# Patient Record
Sex: MALE | Race: WHITE | Employment: FULL TIME | URBAN - METROPOLITAN AREA
[De-identification: names, ages, dates, MRNs, and addresses within clinical notes are randomized per-mention and may not be internally consistent; named-entity substitution may affect disease eponyms.]

---

## 2020-07-21 RX ORDER — DEXAMETHASONE 4 MG/1
4 TABLET ORAL 2 TIMES DAILY WITH MEALS
COMMUNITY

## 2020-07-21 RX ORDER — UBIDECARENONE 75 MG
50 CAPSULE ORAL DAILY
COMMUNITY

## 2020-07-21 NOTE — PROGRESS NOTES
C-Difficile admission screening and protocol:     * Admitted with diarrhea? no     *Prior history of C-Diff. In last 3 months? no     *Antibiotic use in the past 6-8 weeks? No     *Prior hospitalization or nursing home in the last month?    no

## 2020-07-21 NOTE — PROGRESS NOTES
fitting shirt. Bring eye drops or ointment day of surgery. Do not wear any make-up or nail polish on your fingers or toes      For your safety, please do not wear any jewelry or body piercing's on the day of surgery. All jewelry must be removed. If you have dentures, they will be removed before going to operating room. For your convenience, we will provide you with a container. If you wear contact lenses or glasses, they will be removed, please bring a case for them. If you have a living will and a durable power of  for healthcare, please bring in a copy. As part of our patient safety program to minimize surgical site infections, we ask you to do the following:    · Please notify your surgeon if you develop any illness between         now and the  day of your surgery. · This includes a cough, cold, fever, sore throat, nausea,         or vomiting, and diarrhea, etc.  ·  Please notify your surgeon if you experience dizziness, shortness         of breath or blurred vision between now and the time of your surgery. Do not shave your operative site 96 hours prior to surgery. For face and neck surgery, men may use an electric razor 48 hours   prior to surgery. You may shower the night before surgery or the morning of   your surgery with an antibacterial soap. You will need to bring a photo ID and insurance card    [facility] has an onsite pharmacy, would you like to utilize our pharmacy     If you will be staying overnight and use a C-pap machine, please bring   your C-pap to hospital     Our goal is to provide you with excellent care, therefore, visitors will be limited to two(2) in the room at a time so that we may focus on providing this care for you. Please contact pre-admission testing if you have any further questions.                  [facility] phone number:  [phone]    Please note these are generalized instructions for all surgical cases, you may be provided with more specific instructions according to your surgery.

## 2020-07-22 RX ORDER — AMLODIPINE BESYLATE 5 MG/1
5 TABLET ORAL DAILY
COMMUNITY

## 2020-07-24 ENCOUNTER — PREP FOR PROCEDURE (OUTPATIENT)
Dept: OPHTHALMOLOGY | Age: 60
End: 2020-07-24

## 2020-07-24 RX ORDER — SODIUM CHLORIDE 0.9 % (FLUSH) 0.9 %
10 SYRINGE (ML) INJECTION PRN
Status: CANCELLED | OUTPATIENT
Start: 2020-07-24

## 2020-07-24 RX ORDER — TETRACAINE HYDROCHLORIDE 5 MG/ML
1 SOLUTION OPHTHALMIC ONCE
Status: CANCELLED | OUTPATIENT
Start: 2020-07-24 | End: 2020-07-24

## 2020-07-24 RX ORDER — CIPROFLOXACIN HYDROCHLORIDE 3.5 MG/ML
1 SOLUTION/ DROPS TOPICAL SEE ADMIN INSTRUCTIONS
Status: CANCELLED | OUTPATIENT
Start: 2020-07-24

## 2020-07-24 RX ORDER — SODIUM CHLORIDE 0.9 % (FLUSH) 0.9 %
10 SYRINGE (ML) INJECTION EVERY 12 HOURS SCHEDULED
Status: CANCELLED | OUTPATIENT
Start: 2020-07-24

## 2020-07-28 ENCOUNTER — ANESTHESIA EVENT (OUTPATIENT)
Dept: SURGERY | Age: 60
End: 2020-07-28
Payer: COMMERCIAL

## 2020-07-29 NOTE — PROGRESS NOTES
[address]        Pre-Op Phone Call:     Patient Name: Rogelio Saha     No relevant phone numbers on file. Home phone:  [phone]    Surgery Time:   10:10 AM     Arrival Time:  0840     Left extended Message:  NA     Message left with:     Recent change in health status:  No     Advised of transportation/ policy:  Yes     NPO policy reviewed:  Yes wife Esteban Maldonado to take morning heart/blood pressure medications with sips of water morning of surgery? Yes     Instructed to bring eye drops, photo identification, and insurance card day of surgery? Yes     Advised to wear short sleeved button down shirt (no T-shirt underneath):  Yes     Advised not to wear jewelry, hairpins, or pantyhose day of surgery? Yes     Advised not to wear make-up and to wash face day of surgery?   Yes    Remarks:        Electronically signed by:  Mishel Patel RN at 7/29/2020 2:11 PM

## 2020-07-30 ENCOUNTER — HOSPITAL ENCOUNTER (OUTPATIENT)
Age: 60
Setting detail: OUTPATIENT SURGERY
Discharge: HOME | End: 2020-07-30
Attending: OPHTHALMOLOGY | Admitting: OPHTHALMOLOGY
Payer: COMMERCIAL

## 2020-07-30 ENCOUNTER — ANESTHESIA (OUTPATIENT)
Dept: SURGERY | Age: 60
End: 2020-07-30
Payer: COMMERCIAL

## 2020-07-30 VITALS
BODY MASS INDEX: 29.55 KG/M2 | TEMPERATURE: 97.9 F | SYSTOLIC BLOOD PRESSURE: 151 MMHG | OXYGEN SATURATION: 93 % | WEIGHT: 195 LBS | DIASTOLIC BLOOD PRESSURE: 82 MMHG | HEART RATE: 83 BPM | RESPIRATION RATE: 14 BRPM | HEIGHT: 68 IN

## 2020-07-30 VITALS — OXYGEN SATURATION: 100 % | SYSTOLIC BLOOD PRESSURE: 144 MMHG | DIASTOLIC BLOOD PRESSURE: 85 MMHG

## 2020-07-30 PROCEDURE — 6360000002 HC RX W HCPCS: Performed by: NURSE ANESTHETIST, CERTIFIED REGISTERED

## 2020-07-30 PROCEDURE — 3700000000 HC ANESTHESIA ATTENDED CARE: Performed by: OPHTHALMOLOGY

## 2020-07-30 PROCEDURE — 7100000010 HC PHASE II RECOVERY - FIRST 15 MIN: Performed by: OPHTHALMOLOGY

## 2020-07-30 PROCEDURE — 2580000003 HC RX 258: Performed by: ANESTHESIOLOGY

## 2020-07-30 PROCEDURE — V2632 POST CHMBR INTRAOCULAR LENS: HCPCS | Performed by: OPHTHALMOLOGY

## 2020-07-30 PROCEDURE — 6370000000 HC RX 637 (ALT 250 FOR IP): Performed by: OPHTHALMOLOGY

## 2020-07-30 PROCEDURE — 3700000001 HC ADD 15 MINUTES (ANESTHESIA): Performed by: OPHTHALMOLOGY

## 2020-07-30 PROCEDURE — 2500000003 HC RX 250 WO HCPCS: Performed by: OPHTHALMOLOGY

## 2020-07-30 PROCEDURE — 2709999900 HC NON-CHARGEABLE SUPPLY: Performed by: OPHTHALMOLOGY

## 2020-07-30 PROCEDURE — 3600000014 HC SURGERY LEVEL 4 ADDTL 15MIN: Performed by: OPHTHALMOLOGY

## 2020-07-30 PROCEDURE — 3600000004 HC SURGERY LEVEL 4 BASE: Performed by: OPHTHALMOLOGY

## 2020-07-30 DEVICE — LENS IOL SN60WF 19.0D: Type: IMPLANTABLE DEVICE | Site: EYE | Status: FUNCTIONAL

## 2020-07-30 RX ORDER — TETRACAINE HYDROCHLORIDE 5 MG/ML
SOLUTION OPHTHALMIC
Status: COMPLETED | OUTPATIENT
Start: 2020-07-30 | End: 2020-07-30

## 2020-07-30 RX ORDER — OFLOXACIN 3 MG/ML
SOLUTION/ DROPS OPHTHALMIC
Status: COMPLETED | OUTPATIENT
Start: 2020-07-30 | End: 2020-07-30

## 2020-07-30 RX ORDER — SODIUM CHLORIDE 0.9 % (FLUSH) 0.9 %
10 SYRINGE (ML) INJECTION PRN
Status: DISCONTINUED | OUTPATIENT
Start: 2020-07-30 | End: 2020-07-30 | Stop reason: HOSPADM

## 2020-07-30 RX ORDER — FENTANYL CITRATE 50 UG/ML
INJECTION, SOLUTION INTRAMUSCULAR; INTRAVENOUS PRN
Status: DISCONTINUED | OUTPATIENT
Start: 2020-07-30 | End: 2020-07-30 | Stop reason: SDUPTHER

## 2020-07-30 RX ORDER — SODIUM CHLORIDE 0.9 % (FLUSH) 0.9 %
10 SYRINGE (ML) INJECTION EVERY 12 HOURS SCHEDULED
Status: DISCONTINUED | OUTPATIENT
Start: 2020-07-30 | End: 2020-07-30 | Stop reason: SDUPTHER

## 2020-07-30 RX ORDER — BRIMONIDINE TARTRATE 2 MG/ML
SOLUTION/ DROPS OPHTHALMIC
Status: COMPLETED | OUTPATIENT
Start: 2020-07-30 | End: 2020-07-30

## 2020-07-30 RX ORDER — TETRACAINE HYDROCHLORIDE 5 MG/ML
1 SOLUTION OPHTHALMIC ONCE
Status: COMPLETED | OUTPATIENT
Start: 2020-07-30 | End: 2020-07-30

## 2020-07-30 RX ORDER — SODIUM CHLORIDE 9 MG/ML
INJECTION, SOLUTION INTRAVENOUS CONTINUOUS
Status: DISCONTINUED | OUTPATIENT
Start: 2020-07-30 | End: 2020-07-30 | Stop reason: HOSPADM

## 2020-07-30 RX ORDER — BALANCED SALT SOLUTION 6.4; .75; .48; .3; 3.9; 1.7 MG/ML; MG/ML; MG/ML; MG/ML; MG/ML; MG/ML
SOLUTION OPHTHALMIC
Status: COMPLETED | OUTPATIENT
Start: 2020-07-30 | End: 2020-07-30

## 2020-07-30 RX ORDER — SODIUM CHLORIDE 0.9 % (FLUSH) 0.9 %
10 SYRINGE (ML) INJECTION EVERY 12 HOURS SCHEDULED
Status: DISCONTINUED | OUTPATIENT
Start: 2020-07-30 | End: 2020-07-30 | Stop reason: HOSPADM

## 2020-07-30 RX ORDER — SODIUM CHLORIDE 0.9 % (FLUSH) 0.9 %
10 SYRINGE (ML) INJECTION PRN
Status: DISCONTINUED | OUTPATIENT
Start: 2020-07-30 | End: 2020-07-30 | Stop reason: SDUPTHER

## 2020-07-30 RX ORDER — CIPROFLOXACIN HYDROCHLORIDE 3.5 MG/ML
1 SOLUTION/ DROPS TOPICAL SEE ADMIN INSTRUCTIONS
Status: COMPLETED | OUTPATIENT
Start: 2020-07-30 | End: 2020-07-30

## 2020-07-30 RX ADMIN — Medication 0.5 ML: at 08:54

## 2020-07-30 RX ADMIN — FENTANYL CITRATE 50 MCG: 50 INJECTION INTRAMUSCULAR; INTRAVENOUS at 09:56

## 2020-07-30 RX ADMIN — Medication 0.5 ML: at 09:05

## 2020-07-30 RX ADMIN — SODIUM CHLORIDE: 9 INJECTION, SOLUTION INTRAVENOUS at 09:04

## 2020-07-30 RX ADMIN — CIPROFLOXACIN 1 DROP: 3 SOLUTION OPHTHALMIC at 09:08

## 2020-07-30 RX ADMIN — POVIDONE-IODINE 0.1 ML: 5 SOLUTION OPHTHALMIC at 09:03

## 2020-07-30 RX ADMIN — TETRACAINE HYDROCHLORIDE 1 DROP: 5 SOLUTION OPHTHALMIC at 09:02

## 2020-07-30 RX ADMIN — CIPROFLOXACIN 1 DROP: 3 SOLUTION OPHTHALMIC at 09:03

## 2020-07-30 RX ADMIN — FENTANYL CITRATE 50 MCG: 50 INJECTION INTRAMUSCULAR; INTRAVENOUS at 09:53

## 2020-07-30 ASSESSMENT — PAIN - FUNCTIONAL ASSESSMENT: PAIN_FUNCTIONAL_ASSESSMENT: 0-10

## 2020-07-30 ASSESSMENT — PAIN SCALES - GENERAL
PAINLEVEL_OUTOF10: 0
PAINLEVEL_OUTOF10: 0

## 2020-07-30 ASSESSMENT — LIFESTYLE VARIABLES: SMOKING_STATUS: 0

## 2020-07-30 NOTE — PROGRESS NOTES
JOSH RN reviewed and gave patient and family/friend discharge instructions, drop schedule and IOL card. No further questions at this time. Patient stated ready for discharge.

## 2020-07-30 NOTE — ANESTHESIA POSTPROCEDURE EVALUATION
Department of Anesthesiology  Postprocedure Note    Patient: Charmayne Leas  MRN: [id number]  YOB: 1960  Date of evaluation: 7/30/2020  Time:  10:54 AM     Procedure Summary     Date:  07/30/20 Room / Location:  [address]    Anesthesia Start:  3730 Anesthesia Stop:  1006    Procedure:  Phacoemulsification with intraocular lens implant (Left Eye) Diagnosis:       Nuclear sclerosis, left      (Nuclear sclerosis, left)    Surgeon:  Angie Jimenes MD Responsible Provider:  Yin Wharton MD    Anesthesia Type:  MAC ASA Status:  3          Anesthesia Type: MAC    Aileen Phase I: Aileen Score: 10    Aileen Phase II: Aileen Score: 9    Last vitals: Reviewed and per EMR flowsheets.        Anesthesia Post Evaluation    Patient location during evaluation: PACU  Patient participation: complete - patient participated  Level of consciousness: awake and alert  Pain score: 0  Airway patency: patent  Nausea & Vomiting: no nausea and no vomiting  Complications: no  Cardiovascular status: blood pressure returned to baseline  Respiratory status: acceptable  Hydration status: euvolemic

## 2020-07-30 NOTE — ANESTHESIA PRE PROCEDURE
[facility] Department of Anesthesiology  Pre-Anesthesia Evaluation/Consultation       Name:  Malissa Vazquez  : 1960  Age:  62 y.o. MRN:  [id number]  Date: 2020           Surgeon: Surgeon(s):  Oliverio Prasad MD    Procedure: Procedure(s):  Phacoemulsification with intraocular lens implant     No Known Allergies  There is no problem list on file for this patient. 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       Current Outpatient Medications on File Prior to Encounter   Medication Sig Dispense Refill    amLODIPine (NORVASC) 5 MG tablet Take 5 mg by mouth daily      dexamethasone (DECADRON) 4 MG tablet Take 4 mg by mouth 2 times daily (with meals)      apixaban (ELIQUIS) 5 MG TABS tablet Take by mouth 2 times daily      eriBULin mesylate (HALAVEN) 1 MG/2ML SOLN injection Infuse 1.4 mg/m2 intravenously once Indications: on a break from it righ now      vitamin B-12 (CYANOCOBALAMIN) 100 MCG tablet Take 50 mcg by mouth daily       Current Facility-Administered Medications   Medication Dose Route Frequency Provider Last Rate Last Dose    0.9 % sodium chloride infusion   Intravenous Continuous Kellie Alex MD 75 mL/hr at 20 0904      sodium chloride flush 0.9 % injection 10 mL  10 mL Intravenous 2 times per day Kellie Alex MD        sodium chloride flush 0.9 % injection 10 mL  10 mL Intravenous PRN Read MD Suraj        ciprofloxacin (CILOXAN) 0.3 % ophthalmic solution 1 drop  1 drop Left Eye See Admin Instructions Delfino Severin, MD   1 drop at 20 0903    cyclopentolate 1%, phenylephrine 2.5%, tropicamide 1%, ketorolac 0.5% ophthalmic solution  0.5 mL Ophthalmic See Admin Instructions Delfino Severin, MD   0.5 mL at 20 0905     Vital Signs (Current)   Vitals:    20 1322 20 0857   BP:  130/75   Pulse:  78   Resp:  28   Temp:  97 °F (36.1 °C)   TempSrc:  Temporal   SpO2:  100%   Weight: 195 lb (88.5 kg)    Height: 5' 8\" (1.727 m)                                           BP Readings from Last 3 Encounters:   20 130/75     Vital Signs Statistics (for past 48 hrs)     Temp  Av °F (36.1 °C)  Min: 97 °F (36.1 °C)   Min taken time: 20 0857  Max: 97 °F (36.1 °C)   Max taken time: 20 0857  Pulse  Av  Min: 66   Min taken time: 20 0857  Max: 66   Max taken time: 20 0857  Resp  Av  Min: 29   Min taken time: 20 0857  Max: 29   Max taken time: 20 0857  BP  Min: 130/75   Min taken time: 20 0857  Max: 130/75   Max taken time: 20 0857  SpO2  Av %  Min: 100 %   Min taken time: 20 0857  Max: 100 %   Max taken time: 20 0857  BP Readings from Last 3 Encounters:   20 130/75       BMI  Body mass index is 29.65 kg/m². Estimated body mass index is 29.65 kg/m² as calculated from the following:    Height as of this encounter: 5' 8\" (1.727 m). Weight as of this encounter: 195 lb (88.5 kg). CBC No results found for: WBC, RBC, HGB, HCT, MCV, RDW, PLT  CMP  No results found for: NA, K, CL, CO2, BUN, CREATININE, GFRAA, AGRATIO, LABGLOM, GLUCOSE, PROT, CALCIUM, BILITOT, ALKPHOS, AST, ALT  BMP  No results found for: NA, K, CL, CO2, BUN, CREATININE, CALCIUM, GFRAA, LABGLOM, GLUCOSE  POCGlucose  No results for input(s): GLUCOSE in the last 72 hours.    Coags  No results found for: PROTIME, INR, 2020  9:06 AM

## 2020-07-30 NOTE — OP NOTE
[facility]    OPERATIVE NOTE    Preoperative Diagnosis: Cataract left eye    Postoperative Diagnosis: Cataract left eye    Procedure: Phacoemulsification with intraocular lens implantation, left eye  Surgeon: Rosalba Landon MD    Anesthesia: MAC, topical.    Complications: none    Estimated blood loss: less than 1 ml. Specimens: none    Indications for procedure: The patient is a 62y.o. year old with decreased vision, glare and halos around lights, and trouble with activities of daily living. Examination revealed a visually significant cataract in the left eye. Risks, benefits, and alternatives to surgery were discussed with the patient and the patient elected to proceed with phacoemulsification with lens implantation. Details of the procedure: Following informed consent, the patient was taken to the operating room and placed in the supine position. Monitored anesthesia care was administered. The eye was prepped and draped in the usual sterile fashion using aseptic technique for cataract surgery. A side port incision was made. 1% preservative free lidocaine was injected through the side port incision for topical anesthesia. The eye was filled with viscoelastic and a 2.4 mm keratome blade was used to make a 3-plane clear corneal incision in the temporal cornea. The cystitome was used to make a tear in the anterior capsule and a Utrata forceps was used to make a complete curvilinear capsulorrhexis. The lens was hydrodissected and freely rotated. Phacoemulsification was performed. Irrigation/aspiration was used to remove all cortical material from the capsular bag. The eye was filled with viscoelastic and a foldable posterior chamber intraocular lens was injected into the capsular bag. The lens was rotated to the appropriate axis as needed. Irrigation/aspiration was used to remove all excess viscoelastic.   The eye was pressurized with BSS and the wounds were check for leaks and none were

## 2020-08-13 ENCOUNTER — ANESTHESIA EVENT (OUTPATIENT)
Dept: SURGERY | Age: 60
End: 2020-08-13
Payer: COMMERCIAL

## 2020-08-13 ENCOUNTER — PREP FOR PROCEDURE (OUTPATIENT)
Dept: OPHTHALMOLOGY | Age: 60
End: 2020-08-13

## 2020-08-13 RX ORDER — SODIUM CHLORIDE 0.9 % (FLUSH) 0.9 %
10 SYRINGE (ML) INJECTION PRN
Status: CANCELLED | OUTPATIENT
Start: 2020-08-13

## 2020-08-13 RX ORDER — TETRACAINE HYDROCHLORIDE 5 MG/ML
1 SOLUTION OPHTHALMIC ONCE
Status: CANCELLED | OUTPATIENT
Start: 2020-08-13 | End: 2020-08-13

## 2020-08-13 RX ORDER — SODIUM CHLORIDE 0.9 % (FLUSH) 0.9 %
10 SYRINGE (ML) INJECTION EVERY 12 HOURS SCHEDULED
Status: CANCELLED | OUTPATIENT
Start: 2020-08-13

## 2020-08-13 RX ORDER — CIPROFLOXACIN HYDROCHLORIDE 3.5 MG/ML
1 SOLUTION/ DROPS TOPICAL SEE ADMIN INSTRUCTIONS
Status: CANCELLED | OUTPATIENT
Start: 2020-08-13

## 2020-08-14 ENCOUNTER — HOSPITAL ENCOUNTER (OUTPATIENT)
Age: 60
Setting detail: OUTPATIENT SURGERY
Discharge: HOME | End: 2020-08-14
Attending: OPHTHALMOLOGY | Admitting: OPHTHALMOLOGY
Payer: COMMERCIAL

## 2020-08-14 ENCOUNTER — ANESTHESIA (OUTPATIENT)
Dept: SURGERY | Age: 60
End: 2020-08-14
Payer: COMMERCIAL

## 2020-08-14 VITALS — DIASTOLIC BLOOD PRESSURE: 72 MMHG | OXYGEN SATURATION: 100 % | SYSTOLIC BLOOD PRESSURE: 115 MMHG

## 2020-08-14 VITALS
DIASTOLIC BLOOD PRESSURE: 64 MMHG | SYSTOLIC BLOOD PRESSURE: 123 MMHG | HEART RATE: 73 BPM | HEIGHT: 68 IN | WEIGHT: 195 LBS | TEMPERATURE: 97.4 F | BODY MASS INDEX: 29.55 KG/M2 | OXYGEN SATURATION: 96 % | RESPIRATION RATE: 21 BRPM

## 2020-08-14 PROCEDURE — 3700000000 HC ANESTHESIA ATTENDED CARE: Performed by: OPHTHALMOLOGY

## 2020-08-14 PROCEDURE — 6360000002 HC RX W HCPCS: Performed by: NURSE ANESTHETIST, CERTIFIED REGISTERED

## 2020-08-14 PROCEDURE — 3600000014 HC SURGERY LEVEL 4 ADDTL 15MIN: Performed by: OPHTHALMOLOGY

## 2020-08-14 PROCEDURE — V2632 POST CHMBR INTRAOCULAR LENS: HCPCS | Performed by: OPHTHALMOLOGY

## 2020-08-14 PROCEDURE — 6370000000 HC RX 637 (ALT 250 FOR IP): Performed by: OPHTHALMOLOGY

## 2020-08-14 PROCEDURE — 3700000001 HC ADD 15 MINUTES (ANESTHESIA): Performed by: OPHTHALMOLOGY

## 2020-08-14 PROCEDURE — 7100000010 HC PHASE II RECOVERY - FIRST 15 MIN: Performed by: OPHTHALMOLOGY

## 2020-08-14 PROCEDURE — 2580000003 HC RX 258: Performed by: ANESTHESIOLOGY

## 2020-08-14 PROCEDURE — 2709999900 HC NON-CHARGEABLE SUPPLY: Performed by: OPHTHALMOLOGY

## 2020-08-14 PROCEDURE — 2500000003 HC RX 250 WO HCPCS: Performed by: OPHTHALMOLOGY

## 2020-08-14 PROCEDURE — 3600000004 HC SURGERY LEVEL 4 BASE: Performed by: OPHTHALMOLOGY

## 2020-08-14 DEVICE — LENS IOL SN60WF 19.0D: Type: IMPLANTABLE DEVICE | Site: EYE | Status: FUNCTIONAL

## 2020-08-14 RX ORDER — CIPROFLOXACIN HYDROCHLORIDE 3.5 MG/ML
1 SOLUTION/ DROPS TOPICAL SEE ADMIN INSTRUCTIONS
Status: COMPLETED | OUTPATIENT
Start: 2020-08-14 | End: 2020-08-14

## 2020-08-14 RX ORDER — TETRACAINE HYDROCHLORIDE 5 MG/ML
1 SOLUTION OPHTHALMIC ONCE
Status: COMPLETED | OUTPATIENT
Start: 2020-08-14 | End: 2020-08-14

## 2020-08-14 RX ORDER — SODIUM CHLORIDE 0.9 % (FLUSH) 0.9 %
10 SYRINGE (ML) INJECTION EVERY 12 HOURS SCHEDULED
Status: DISCONTINUED | OUTPATIENT
Start: 2020-08-14 | End: 2020-08-14 | Stop reason: HOSPADM

## 2020-08-14 RX ORDER — SODIUM CHLORIDE 0.9 % (FLUSH) 0.9 %
10 SYRINGE (ML) INJECTION EVERY 12 HOURS SCHEDULED
Status: DISCONTINUED | OUTPATIENT
Start: 2020-08-14 | End: 2020-08-14 | Stop reason: SDUPTHER

## 2020-08-14 RX ORDER — FENTANYL CITRATE 50 UG/ML
INJECTION, SOLUTION INTRAMUSCULAR; INTRAVENOUS PRN
Status: DISCONTINUED | OUTPATIENT
Start: 2020-08-14 | End: 2020-08-14 | Stop reason: SDUPTHER

## 2020-08-14 RX ORDER — ONDANSETRON 2 MG/ML
INJECTION INTRAMUSCULAR; INTRAVENOUS PRN
Status: DISCONTINUED | OUTPATIENT
Start: 2020-08-14 | End: 2020-08-14 | Stop reason: SDUPTHER

## 2020-08-14 RX ORDER — MIDAZOLAM HYDROCHLORIDE 1 MG/ML
INJECTION INTRAMUSCULAR; INTRAVENOUS PRN
Status: DISCONTINUED | OUTPATIENT
Start: 2020-08-14 | End: 2020-08-14 | Stop reason: SDUPTHER

## 2020-08-14 RX ORDER — SODIUM CHLORIDE 9 MG/ML
INJECTION, SOLUTION INTRAVENOUS CONTINUOUS
Status: DISCONTINUED | OUTPATIENT
Start: 2020-08-14 | End: 2020-08-14 | Stop reason: HOSPADM

## 2020-08-14 RX ORDER — TETRACAINE HYDROCHLORIDE 5 MG/ML
SOLUTION OPHTHALMIC
Status: COMPLETED | OUTPATIENT
Start: 2020-08-14 | End: 2020-08-14

## 2020-08-14 RX ORDER — OFLOXACIN 3 MG/ML
SOLUTION/ DROPS OPHTHALMIC
Status: COMPLETED | OUTPATIENT
Start: 2020-08-14 | End: 2020-08-14

## 2020-08-14 RX ORDER — SODIUM CHLORIDE 0.9 % (FLUSH) 0.9 %
10 SYRINGE (ML) INJECTION PRN
Status: DISCONTINUED | OUTPATIENT
Start: 2020-08-14 | End: 2020-08-14 | Stop reason: SDUPTHER

## 2020-08-14 RX ORDER — ONDANSETRON 2 MG/ML
4 INJECTION INTRAMUSCULAR; INTRAVENOUS
Status: DISCONTINUED | OUTPATIENT
Start: 2020-08-14 | End: 2020-08-14 | Stop reason: HOSPADM

## 2020-08-14 RX ORDER — BALANCED SALT SOLUTION 6.4; .75; .48; .3; 3.9; 1.7 MG/ML; MG/ML; MG/ML; MG/ML; MG/ML; MG/ML
SOLUTION OPHTHALMIC
Status: COMPLETED | OUTPATIENT
Start: 2020-08-14 | End: 2020-08-14

## 2020-08-14 RX ORDER — SODIUM CHLORIDE 0.9 % (FLUSH) 0.9 %
10 SYRINGE (ML) INJECTION PRN
Status: DISCONTINUED | OUTPATIENT
Start: 2020-08-14 | End: 2020-08-14 | Stop reason: HOSPADM

## 2020-08-14 RX ORDER — BRIMONIDINE TARTRATE 2 MG/ML
SOLUTION/ DROPS OPHTHALMIC
Status: COMPLETED | OUTPATIENT
Start: 2020-08-14 | End: 2020-08-14

## 2020-08-14 RX ADMIN — MIDAZOLAM 1 MG: 1 INJECTION INTRAMUSCULAR; INTRAVENOUS at 09:36

## 2020-08-14 RX ADMIN — FENTANYL CITRATE 50 MCG: 50 INJECTION INTRAMUSCULAR; INTRAVENOUS at 09:40

## 2020-08-14 RX ADMIN — FENTANYL CITRATE 50 MCG: 50 INJECTION INTRAMUSCULAR; INTRAVENOUS at 09:36

## 2020-08-14 RX ADMIN — POVIDONE-IODINE 1 DROP: 5 SOLUTION OPHTHALMIC at 09:09

## 2020-08-14 RX ADMIN — MIDAZOLAM 1 MG: 1 INJECTION INTRAMUSCULAR; INTRAVENOUS at 09:39

## 2020-08-14 RX ADMIN — CIPROFLOXACIN 1 DROP: 3 SOLUTION OPHTHALMIC at 09:06

## 2020-08-14 RX ADMIN — TETRACAINE HYDROCHLORIDE 1 DROP: 5 SOLUTION OPHTHALMIC at 08:59

## 2020-08-14 RX ADMIN — SODIUM CHLORIDE: 9 INJECTION, SOLUTION INTRAVENOUS at 09:06

## 2020-08-14 RX ADMIN — ONDANSETRON 4 MG: 2 INJECTION INTRAMUSCULAR; INTRAVENOUS at 09:39

## 2020-08-14 RX ADMIN — Medication 0.5 ML: at 09:03

## 2020-08-14 RX ADMIN — Medication 0.5 ML: at 09:08

## 2020-08-14 RX ADMIN — SODIUM CHLORIDE: 9 INJECTION, SOLUTION INTRAVENOUS at 09:32

## 2020-08-14 RX ADMIN — CIPROFLOXACIN 1 DROP: 3 SOLUTION OPHTHALMIC at 09:01

## 2020-08-14 ASSESSMENT — PAIN SCALES - GENERAL
PAINLEVEL_OUTOF10: 0

## 2020-08-14 ASSESSMENT — LIFESTYLE VARIABLES: SMOKING_STATUS: 0

## 2020-08-14 NOTE — ANESTHESIA POSTPROCEDURE EVALUATION
Department of Anesthesiology  Postprocedure Note    Patient: Mitzy Paulino  MRN: [id number]  YOB: 1960  Date of evaluation: 8/14/2020  Time:  10:10 AM     Procedure Summary     Date:  08/14/20 Room / Location:  [address]    Anesthesia Start:  7800 Anesthesia Stop:  4458    Procedure:  Phacoemulsification with intraocular lens implant (Right Eye) Diagnosis:       Nuclear sclerosis, right      (Nuclear sclerosis, right)    Surgeon:  Jackie Magallon MD Responsible Provider:  Catina Butler MD    Anesthesia Type:  MAC ASA Status:  3          Anesthesia Type: MAC    Aileen Phase I: Aileen Score: 10    Aileen Phase II: Aileen Score: 10    Last vitals: Reviewed and per EMR flowsheets.        Anesthesia Post Evaluation    Patient location during evaluation: bedside  Patient participation: complete - patient participated  Level of consciousness: awake  Pain score: 0  Airway patency: patent  Nausea & Vomiting: no nausea and no vomiting  Complications: no  Cardiovascular status: blood pressure returned to baseline  Respiratory status: acceptable  Hydration status: euvolemic

## 2020-08-14 NOTE — ANESTHESIA PRE PROCEDURE
[facility] Department of Anesthesiology  Pre-Anesthesia Evaluation/Consultation       Name:  Bj Meyer  : 1960  Age:  62 y.o. MRN:  [id number]  Date: 2020           Surgeon: Surgeon(s):  Marjorie Cagle MD    Procedure: Procedure(s):  Phacoemulsification with intraocular lens implant     No Known Allergies  There is no problem list on file for this patient.     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      Past Surgical History:   Procedure Laterality Date    ABDOMEN SURGERY      sarcoma 17 lbs removed     APPENDECTOMY      INTRACAPSULAR CATARACT EXTRACTION Left 2020    Phacoemulsification with intraocular lens implant performed by Marjorie Cagle MD at [place] [address] TUNNELED VENOUS CATHETER PLACEMENT       Social History     Tobacco Use    Smoking status: Never Smoker    Smokeless tobacco: Never Used   Substance Use Topics    Alcohol use: Yes     Comment: 1 beer or 2 in evening     Drug use: Never     Medications  Current Facility-Administered Medications on File Prior to Visit   Medication Dose Route Frequency Provider Last Rate Last Dose    0.9 % sodium chloride infusion   Intravenous Continuous Talon Cross MD        sodium chloride flush 0.9 % injection 10 mL  10 mL Intravenous 2 times per day Talon Cross MD        sodium chloride flush 0.9 % injection 10 mL  10 mL Intravenous PRN Talon Cross MD        sodium chloride flush 0.9 % injection 10 mL  10 mL Intravenous 2 times per day Marjorie Cagle MD        sodium chloride flush 0.9 % injection 10 mL  10 mL Intravenous PRN Marjorie Cagle MD        ciprofloxacin (CILOXAN) 0.3 % ophthalmic solution 1 drop  1 drop Right Eye See Admin Instructions Marjorie Cagle MD        cyclopentolate 1%, phenylephrine 2.5%, tropicamide 1%, ketorolac 0.5% ophthalmic solution 3 mL Ophthalmic See Admin Instructions Umesh Dunbar MD        povidone-iodine 5 % ophthalmic solution   Right Eye Once Umesh Dunbar MD        tetracaine (TETRAVISC) 0.5 % ophthalmic solution 1 drop  1 drop Right Eye Once Umesh Dunbar MD         Current Outpatient Medications on File Prior to Visit   Medication Sig Dispense Refill    amLODIPine (NORVASC) 5 MG tablet Take 5 mg by mouth daily      dexamethasone (DECADRON) 4 MG tablet Take 4 mg by mouth 2 times daily (with meals)      apixaban (ELIQUIS) 5 MG TABS tablet Take by mouth 2 times daily      eriBULin mesylate (HALAVEN) 1 MG/2ML SOLN injection Infuse 1.4 mg/m2 intravenously once Indications: on a break from it righ now      vitamin B-12 (CYANOCOBALAMIN) 100 MCG tablet Take 50 mcg by mouth daily       No current facility-administered medications for this visit. No current outpatient medications on file.      Facility-Administered Medications Ordered in Other Visits   Medication Dose Route Frequency Provider Last Rate Last Dose    0.9 % sodium chloride infusion   Intravenous Continuous Lakshmi Mazariegos MD        sodium chloride flush 0.9 % injection 10 mL  10 mL Intravenous 2 times per day Lakshmi Mazariegos MD        sodium chloride flush 0.9 % injection 10 mL  10 mL Intravenous PRN Lakshmi Mazariegos MD        sodium chloride flush 0.9 % injection 10 mL  10 mL Intravenous 2 times per day Umesh Dunbar MD        sodium chloride flush 0.9 % injection 10 mL  10 mL Intravenous PRN Umesh Dunbar MD        ciprofloxacin (CILOXAN) 0.3 % ophthalmic solution 1 drop  1 drop Right Eye See Admin Instructions Umesh Dunbar MD        cyclopentolate 1%, phenylephrine 2.5%, tropicamide 1%, ketorolac 0.5% ophthalmic solution  3 mL Ophthalmic See Admin Instructions Umesh Dunbar MD        povidone-iodine 5 % ophthalmic solution   Right Eye Once Umesh Dunbar MD        tetracaine (TETRAVISC) 0.5 % ophthalmic solution 1 drop  1 drop Right Eye Once Marjorie Cagle MD         Vital Signs (Current)   There were no vitals filed for this visit. BP Readings from Last 3 Encounters:   07/30/20 (!) 144/85   07/30/20 (!) 151/82     Vital Signs Statistics (for past 48 hrs)     No data recorded  BP Readings from Last 3 Encounters:   07/30/20 (!) 144/85   07/30/20 (!) 151/82       BMI  There is no height or weight on file to calculate BMI. Estimated body mass index is 29.65 kg/m² as calculated from the following:    Height as of 8/6/20: 5' 8\" (1.727 m). Weight as of 8/6/20: 195 lb (88.5 kg). CBC No results found for: WBC, RBC, HGB, HCT, MCV, RDW, PLT  CMP  No results found for: NA, K, CL, CO2, BUN, CREATININE, GFRAA, AGRATIO, LABGLOM, GLUCOSE, PROT, CALCIUM, BILITOT, ALKPHOS, AST, ALT  BMP  No results found for: NA, K, CL, CO2, BUN, CREATININE, CALCIUM, GFRAA, LABGLOM, GLUCOSE  POCGlucose  No results for input(s): GLUCOSE in the last 72 hours.    Coags  No results found for: PROTIME, INR, APTT  HCG (If Applicable) No results found for: PREGTESTUR, PREGSERUM, HCG, HCGQUANT   ABGs No results found for: PHART, PO2ART, YOC2WKB, TSI2AJE, BEART, C4WOUIXP   Type & Screen (If Applicable)  No results found for: LABABO, LABRH                         BMI: Wt Readings from Last 3 Encounters:       NPO Status:                          Anesthesia Evaluation  Patient summary reviewed no history of anesthetic complications:   Airway: Mallampati: II  TM distance: >3 FB   Neck ROM: full  Mouth opening: > = 3 FB Dental: normal exam         Pulmonary: breath sounds clear to auscultation      (-) COPD, asthma, sleep apnea and not a current smoker                           Cardiovascular:  Exercise tolerance: good (>4 METS),   (+) hypertension:,     (-) past MI and  angina      Rhythm: regular  Rate: normal                    Neuro/Psych:      (-) seizures, TIA and CVA           GI/Hepatic/Renal:        (-) GERD, liver disease, no renal disease and no morbid obesity       Endo/Other:    (+) blood dyscrasia::., malignancy/cancer. (-) diabetes mellitus, hypothyroidism               Abdominal:           Vascular:   + DVT, . Anesthesia Plan      MAC     ASA 3     (Ondansetron )  Induction: intravenous. Anesthetic plan and risks discussed with patient. Plan discussed with CRNA. Attending anesthesiologist reviewed and agrees with Pre Eval content            This pre-anesthesia assessment may be used as a history and physical.    DOS STAFF ADDENDUM:    Pt seen and examined, chart reviewed (including anesthesia, drug and allergy history). No interval changes to history and physical examination. Anesthetic plan, risks, benefits, alternatives, and personnel involved discussed with patient. Patient verbalized an understanding and agrees to proceed.       Dominique Ponce MD  August 14, 2020  8:52 AM

## 2020-08-14 NOTE — PROGRESS NOTES
[address]        Pre-Op Phone Call:     Patient Name: Danilo Escudero     Telephone Information:   Mobile [phone]     Home phone:  [phone]    Surgery Time:    9:50 AM     Arrival Time:  0820      Left extended Message:  No called several times phone is not working      Message left with:     Recent change in health status:       Advised of transportation/ policy:       NPO policy reviewed: Advised to take morning heart/blood pressure medications with sips of water morning of surgery? Instructed to bring eye drops, photo identification, and insurance card day of surgery? Advised to wear short sleeved button down shirt (no T-shirt underneath): Advised not to wear jewelry, hairpins, or pantyhose day of surgery? Advised not to wear make-up and to wash face day of surgery?     Remarks:        Electronically signed by:  Marimar Lr RN at 8/13/2020 2:25 PM
Discharge instructions reviewed with patient and family, no further questions.
Preoperative Screening for Elective Surgery/Invasive Procedures While COVID-19 present in the community     Have you tested positive or have been told to self-isolate for COVID-19 like symptoms within the past 28 days? no   Do you currently have any of the following symptoms? no  o Fever >100.0 F or 99.9 F in immunocompromised patients? no  o New onset cough, shortness of breath or difficulty breathing? no  o New onset sore throat, myalgia (muscle aches and pains), headache, loss of taste/smell or diarrhea? no   Have you had a potential exposure to COVID-19 within the past 14 days by:  o Close contact with a confirmed case? no  o Close contact with a healthcare worker,  or essential infrastructure worker (grocery store, restaurant, gas station, public utilities or transportation)? no  o Do you reside in a congregate setting such as; skilled nursing facility, adult home, correctional facility, homeless shelter or other institutional setting? no  o Have you had recent travel to a known COVID-19 hotspot? no    Indicate if the patient has a positive screen by answering yes to one or more of the above questions. Patients who test positive or screen positive prior to surgery or on the day of surgery should be evaluated in conjunction with the surgeon/proceduralist/anesthesiologist to determine the urgency of the procedure.
Do not wear any make-up or nail polish on your fingers or toes      For your safety, please do not wear any jewelry or body piercing's on the day of surgery. All jewelry must be removed. If you have dentures, they will be removed before going to operating room. For your convenience, we will provide you with a container. If you wear contact lenses or glasses, they will be removed, please bring a case for them. If you have a living will and a durable power of  for healthcare, please bring in a copy. As part of our patient safety program to minimize surgical site infections, we ask you to do the following:    · Please notify your surgeon if you develop any illness between         now and the  day of your surgery. · This includes a cough, cold, fever, sore throat, nausea,         or vomiting, and diarrhea, etc.  ·  Please notify your surgeon if you experience dizziness, shortness         of breath or blurred vision between now and the time of your surgery. Do not shave your operative site 96 hours prior to surgery. For face and neck surgery, men may use an electric razor 48 hours   prior to surgery. You may shower the night before surgery or the morning of   your surgery with an antibacterial soap. You will need to bring a photo ID and insurance card    [facility] has an onsite pharmacy, would you like to utilize our pharmacy     If you will be staying overnight and use a C-pap machine, please bring   your C-pap to hospital     Our goal is to provide you with excellent care, therefore, visitors will be limited to two(2) in the room at a time so that we may focus on providing this care for you. Please contact pre-admission testing if you have any further questions. [facility] phone number:  [phone]  Please note these are generalized instructions for all surgical cases, you may be provided with more specific instructions according to your surgery.

## 2020-08-14 NOTE — OP NOTE
Tj Johnsford    OPERATIVE NOTE    Preoperative Diagnosis: Cataract right eye    Postoperative Diagnosis: Cataract right eye    Procedure: Phacoemulsification with intraocular lens implantation, right eye  Surgeon: Brody Gill MD    Anesthesia: MAC, topical.    Complications: none    Estimated blood loss: less than 1 ml. Specimens: none    Indications for procedure: The patient is a 62y.o. year old with decreased vision, glare and halos around lights, and trouble with activities of daily living. Examination revealed a visually significant cataract in the right eye. Risks, benefits, and alternatives to surgery were discussed with the patient and the patient elected to proceed with phacoemulsification with lens implantation. Details of the procedure: Following informed consent, the patient was taken to the operating room and placed in the supine position. Monitored anesthesia care was administered. The eye was prepped and draped in the usual sterile fashion using aseptic technique for cataract surgery. A side port incision was made. 1% preservative free lidocaine was injected through the side port incision for topical anesthesia. The eye was filled with viscoelastic and a 2.4 mm keratome blade was used to make a 3-plane clear corneal incision in the temporal cornea. The cystitome was used to make a tear in the anterior capsule and a Utrata forceps was used to make a complete curvilinear capsulorrhexis. The lens was hydrodissected and freely rotated. Phacoemulsification was performed. Irrigation/aspiration was used to remove all cortical material from the capsular bag. The eye was filled with viscoelastic and a foldable posterior chamber intraocular lens was injected into the capsular bag. The lens was rotated to the appropriate axis as needed. Irrigation/aspiration was used to remove all excess viscoelastic.   The eye was pressurized with BSS and the wounds were check for leaks and none were found. The patient had ofloxacin and Alphagan solutions placed on the eye. The patient went to the PACU in excellent condition, having tolerated the procedure well.

## (undated) DEVICE — SYRINGE MED 30ML STD CLR PLAS LUERLOCK TIP N CTRL DISP

## (undated) DEVICE — SURGICAL PROC PACK SHT WEST V4

## (undated) DEVICE — SYRINGE MED 3ML CLR PLAS STD N CTRL LUERLOCK TIP DISP

## (undated) DEVICE — SYRINGE TB 1ML NDL 25GA L0.625IN PLAS SLIP TIP CONVENTIONAL

## (undated) DEVICE — Device: Brand: MEDEX

## (undated) DEVICE — SOLUTION IV IRRIG WATER 500ML POUR BRL ST 2F7113

## (undated) DEVICE — Device

## (undated) DEVICE — GLOVE,SURG,TRIUMPH MICRO,LTX,PF,7.5: Brand: MEDLINE

## (undated) DEVICE — SOLUTION IRRIG BSS ST 500ML